# Patient Record
Sex: FEMALE | Race: WHITE | NOT HISPANIC OR LATINO | Employment: UNEMPLOYED | URBAN - METROPOLITAN AREA
[De-identification: names, ages, dates, MRNs, and addresses within clinical notes are randomized per-mention and may not be internally consistent; named-entity substitution may affect disease eponyms.]

---

## 2017-08-18 PROBLEM — Z20.821 ZIKA VIRUS EXPOSURE: Status: ACTIVE | Noted: 2017-08-18

## 2017-08-18 PROBLEM — E28.2 PCOS (POLYCYSTIC OVARIAN SYNDROME): Status: ACTIVE | Noted: 2017-08-18

## 2017-08-18 PROBLEM — E03.9 HYPOTHYROID: Status: ACTIVE | Noted: 2017-08-18

## 2017-08-23 PROBLEM — O26.899 RH NEGATIVE STATE IN ANTEPARTUM PERIOD: Status: ACTIVE | Noted: 2017-08-23

## 2017-08-23 PROBLEM — Z67.91 RH NEGATIVE STATE IN ANTEPARTUM PERIOD: Status: ACTIVE | Noted: 2017-08-23

## 2017-08-23 PROBLEM — R76.8 HEPATITIS C ANTIBODY POSITIVE IN BLOOD: Status: ACTIVE | Noted: 2017-08-23

## 2017-11-27 ENCOUNTER — HOSPITAL ENCOUNTER (OUTPATIENT)
Dept: OBSTETRICS AND GYNECOLOGY | Facility: HOSPITAL | Age: 27
Discharge: HOME | End: 2017-11-27
Attending: OBSTETRICS & GYNECOLOGY
Payer: OTHER GOVERNMENT

## 2017-11-27 DIAGNOSIS — Z29.13 ENCOUNTER FOR PROPHYLACTIC ADMINISTRATION OF RHOGAM: ICD-10-CM

## 2017-11-27 LAB — INJECT RH IG VOL PATIENT: NORMAL ML

## 2017-11-27 PROCEDURE — 63600519 RHOGAM PHARM REV CODE 636 ALT 250 W HCPCS: Performed by: OBSTETRICS & GYNECOLOGY

## 2017-11-27 RX ADMIN — HUMAN RHO(D) IMMUNE GLOBULIN 300 MCG: 300 INJECTION, SOLUTION INTRAMUSCULAR at 02:11

## 2018-09-12 ENCOUNTER — TELEPHONE (OUTPATIENT)
Dept: INTERNAL MEDICINE | Facility: CLINIC | Age: 28
End: 2018-09-12

## 2018-09-12 NOTE — TELEPHONE ENCOUNTER
----- Message from Melyssa Sylvester sent at 9/12/2018  1:19 PM CDT -----  Contact: self [phone]  I have the pt on my line trying to find out if  has received an authorization via fax so she would be able to schedule an appt with her office. Please call back.      Thanks

## 2018-09-12 NOTE — TELEPHONE ENCOUNTER
"Returned pt call; not available; left voicemail advising of receiving authorization, and instructions to call phone staff to schedule appt.     Authorization has been received via fax from [facility] consenting to scheduling of appt with the following comment:    "29 y/o female diagnosed anxiety. Please send to counseling for CBT and techniques to reduce symptoms. Eval and treat."      auth / order # - [id number]  "

## 2018-10-01 ENCOUNTER — TELEPHONE (OUTPATIENT)
Dept: INTERNAL MEDICINE | Facility: CLINIC | Age: 28
End: 2018-10-01

## 2018-10-01 NOTE — TELEPHONE ENCOUNTER
----- Message from Melyssa Sylvester sent at 10/1/2018  9:15 AM CDT -----  Contact: Patient [phone]  Pt is calling to speak with someone in regards to receiving her medical records. Please call back and advise.      Thanks

## 2018-10-01 NOTE — TELEPHONE ENCOUNTER
Called pt; informed we have not yet received medical records; she is calling previous doctors office and having them refax.

## 2020-10-20 ENCOUNTER — LAB VISIT (OUTPATIENT)
Dept: LAB | Facility: HOSPITAL | Age: 30
End: 2020-10-20
Attending: OBSTETRICS & GYNECOLOGY
Payer: OTHER GOVERNMENT

## 2020-10-20 DIAGNOSIS — O26.899 HEMANGIOMAS OF SKIN IN PREGNANCY: Primary | ICD-10-CM

## 2020-10-20 DIAGNOSIS — D18.01 HEMANGIOMAS OF SKIN IN PREGNANCY: Primary | ICD-10-CM

## 2020-10-20 LAB
ABO + RH BLD: NORMAL
BLD GP AB SCN CELLS X3 SERPL QL: NORMAL

## 2020-10-20 PROCEDURE — 36415 COLL VENOUS BLD VENIPUNCTURE: CPT

## 2020-10-20 PROCEDURE — 86901 BLOOD TYPING SEROLOGIC RH(D): CPT

## 2020-10-21 ENCOUNTER — HOSPITAL ENCOUNTER (OUTPATIENT)
Dept: OBSTETRICS AND GYNECOLOGY | Facility: HOSPITAL | Age: 30
Discharge: HOME | End: 2020-10-21
Attending: OBSTETRICS & GYNECOLOGY
Payer: OTHER GOVERNMENT

## 2020-10-21 DIAGNOSIS — D18.01 HEMANGIOMAS OF SKIN IN PREGNANCY: ICD-10-CM

## 2020-10-21 DIAGNOSIS — O26.899 HEMANGIOMAS OF SKIN IN PREGNANCY: ICD-10-CM

## 2020-10-21 LAB — INJECT RH IG VOL PATIENT: NORMAL ML

## 2020-10-21 PROCEDURE — 96372 THER/PROPH/DIAG INJ SC/IM: CPT

## 2020-10-21 PROCEDURE — 63600519 RHOGAM PHARM REV CODE 636 ALT 250 W HCPCS: Performed by: OBSTETRICS & GYNECOLOGY

## 2020-10-21 RX ADMIN — HUMAN RHO(D) IMMUNE GLOBULIN 300 MCG: 300 INJECTION, SOLUTION INTRAMUSCULAR at 11:10
